# Patient Record
Sex: MALE | URBAN - METROPOLITAN AREA
[De-identification: names, ages, dates, MRNs, and addresses within clinical notes are randomized per-mention and may not be internally consistent; named-entity substitution may affect disease eponyms.]

---

## 2024-10-28 ENCOUNTER — TELEPHONE (OUTPATIENT)
Dept: CARDIOLOGY CLINIC | Age: 74
End: 2024-10-28

## 2024-10-28 NOTE — TELEPHONE ENCOUNTER
New Patient Referral    Referring Provider Name: BrynnDAVID Gillespie   Phone Number: [phone]  Fax Number: [phone]  Address: [address] [place], [place] [address] , [place] [address]    Diagnosis/Reason for Visit: Palpitations    Cardiac Clearance? unsure    Cardiac Testing: (Yes/No/Unsure) unsure    Date testing was completed?___________      Have records been requested? (Yes/No) unsure    Preferred Language:___________    needed? (Yes/No)    Phoned patient and LVM to call and schedule appointment.

## 2024-10-30 NOTE — TELEPHONE ENCOUNTER
Phoned with Language Services   Farhana [phone].    Patient was referred by DAVID Kiser  to the [facility] location with Dr. Núñez.  Patient has been scheduled for 11/21/24 at 2:30 (am/pm).  Patient verbalized understanding of appointment instructions.  Call complete.

## 2024-11-11 NOTE — PROGRESS NOTES
[facility]  Cardiology Note  [phone]      Chief Complaint   Patient presents with    New Patient     Chest pain left side below  breast bone.     Hypertension        History of Present Illness:  Ash Barnes is a 74 y.o. patient PMHx HTN, palpitations, primary open angle glaucoma.     10/24/24 OV with PCP: Noted pt recently moved to [place] from [place]. Medicated for HTN, but had been out of prescribed medications. States history of arrhythmia for the past 10 years, had an echo 5 years ago and takes his atenolol as needed with elevated HR.     Patient referred for palpitations and arrhythmia by Brynn Santana CNP of [facility] [employer]. I have been asked to provide consultation regarding further management and testing.    Today, patient arrived to the office ambulating independently, accompanied by family. He reports high blood pressure for many years. Has never been established with cardiology in the past. He denies history of arrhythmia.    He has occasional chest pain that lasts a couples seconds. Pain not reproducible with exercise or palpation, pain comes while lying down. Happens every month or so. Pt denies exertional chest pain, DURAN/PND, palpitations, light-headedness and edema.     ITryolabspad  used for communication throughout visit.     Past Medical History:   has no past medical history on file.    Surgical History:   has a past surgical history that includes Inguinal hernia repair (Left).     Social History:   reports that he has never smoked. He has never used smokeless tobacco.     Family History:  History reviewed. No pertinent family history.    Home Medications:  Were reviewed and are listed in nursing record. and/or listed below  Prior to Admission medications    Medication Sig Start Date End Date Taking? Authorizing Provider   enalapril (VASOTEC) 20 MG tablet Take 1 tablet by mouth 2 times daily 11/12/24  Yes Provider, MD Colton

## 2024-11-21 ENCOUNTER — OFFICE VISIT (OUTPATIENT)
Dept: CARDIOLOGY CLINIC | Age: 74
End: 2024-11-21
Payer: COMMERCIAL

## 2024-11-21 VITALS
DIASTOLIC BLOOD PRESSURE: 82 MMHG | OXYGEN SATURATION: 97 % | HEIGHT: 65 IN | HEART RATE: 79 BPM | SYSTOLIC BLOOD PRESSURE: 124 MMHG | BODY MASS INDEX: 28.91 KG/M2 | WEIGHT: 173.5 LBS

## 2024-11-21 DIAGNOSIS — R07.89 ATYPICAL CHEST PAIN: ICD-10-CM

## 2024-11-21 DIAGNOSIS — Z75.8 SPANISH LANGUAGE INTERPRETER NEEDED: ICD-10-CM

## 2024-11-21 DIAGNOSIS — I10 PRIMARY HYPERTENSION: Primary | ICD-10-CM

## 2024-11-21 DIAGNOSIS — Z76.89 ESTABLISHING CARE WITH NEW DOCTOR, ENCOUNTER FOR: ICD-10-CM

## 2024-11-21 PROCEDURE — 3074F SYST BP LT 130 MM HG: CPT | Performed by: INTERNAL MEDICINE

## 2024-11-21 PROCEDURE — 93000 ELECTROCARDIOGRAM COMPLETE: CPT | Performed by: INTERNAL MEDICINE

## 2024-11-21 PROCEDURE — 1123F ACP DISCUSS/DSCN MKR DOCD: CPT | Performed by: INTERNAL MEDICINE

## 2024-11-21 PROCEDURE — 3079F DIAST BP 80-89 MM HG: CPT | Performed by: INTERNAL MEDICINE

## 2024-11-21 PROCEDURE — 99204 OFFICE O/P NEW MOD 45 MIN: CPT | Performed by: INTERNAL MEDICINE

## 2024-11-21 RX ORDER — IBUPROFEN 800 MG/1
800 TABLET, FILM COATED ORAL EVERY 6 HOURS PRN
COMMUNITY
Start: 2024-11-15

## 2024-11-21 RX ORDER — ATENOLOL 25 MG/1
1 TABLET ORAL DAILY
COMMUNITY
Start: 2024-11-15

## 2024-11-21 RX ORDER — HYDROCHLOROTHIAZIDE 25 MG/1
25 TABLET ORAL DAILY
COMMUNITY

## 2024-11-21 RX ORDER — ENALAPRIL MALEATE 20 MG/1
1 TABLET ORAL 2 TIMES DAILY
COMMUNITY
Start: 2024-11-12